# Patient Record
Sex: MALE | Race: WHITE
[De-identification: names, ages, dates, MRNs, and addresses within clinical notes are randomized per-mention and may not be internally consistent; named-entity substitution may affect disease eponyms.]

---

## 2020-02-15 ENCOUNTER — HOSPITAL ENCOUNTER (EMERGENCY)
Dept: HOSPITAL 75 - ER FS | Age: 40
Discharge: HOME | End: 2020-02-15
Payer: COMMERCIAL

## 2020-02-15 VITALS — DIASTOLIC BLOOD PRESSURE: 82 MMHG | SYSTOLIC BLOOD PRESSURE: 131 MMHG

## 2020-02-15 VITALS — BODY MASS INDEX: 32.31 KG/M2 | HEIGHT: 70.98 IN | WEIGHT: 230.82 LBS

## 2020-02-15 DIAGNOSIS — J11.1: Primary | ICD-10-CM

## 2020-02-15 PROCEDURE — 99282 EMERGENCY DEPT VISIT SF MDM: CPT

## 2020-02-15 NOTE — ED FEVER
History of Present Illness


General


Stated Complaint:  FLU SYMPTOMS


Source:  patient


Exam Limitations:  no limitations





History of Present Illness


Date Seen by Provider:  Feb 15, 2020


Time Seen by Provider:  18:22


Initial Comments


40-year-old male presents with cough, fever, chills, body aches, decreased 

appetite, nausea, sore throat that started 2 days ago. Patient is not having 

shortness of breath or chest pain. He is not having a rash.





Allergies and Home Medications


Allergies


Coded Allergies:  


     No Known Drug Allergies (Unverified , 2/15/20)





Home Medications


Oseltamivir Phosphate 75 Mg Cap, 75 MG PO BID


   Prescribed by: RAMONITA ELIZABETH on 2/15/20 8426





Patient Home Medication List


Home Medication List Reviewed:  Yes





Review of Systems


Review of Systems


Constitutional:  see HPI, chills, fever, malaise


EENTM:  throat pain


Respiratory:  cough


Cardiovascular:  no symptoms reported


Gastrointestinal:  No diarrhea; nausea; No vomiting


Musculoskeletal:  see HPI


Skin:  no symptoms reported


Psychiatric/Neurological:  See HPI





Past Medical-Social-Family Hx


Past Med/Social Hx:  Reviewed Nursing Past Med/Soc Hx


Patient Social History


Recent Foreign Travel:  No


Contact w/Someone Who Travel:  No





Physical Exam





Vital Signs - First Documented




















Capillary Refill :


Height: '"


Weight: lbs. oz. kg;  BMI


Method:


General Appearance:  WD/WN, no apparent distress


Eyes:  Bilateral Eye Normal Inspection, Bilateral Eye PERRL


Respiratory:  chest non-tender, lungs clear


Cardiovascular:  normal peripheral pulses, regular rate, rhythm


Gastrointestinal:  non tender, soft


Extremities:  normal capillary refill


Neurologic/Psychiatric:  CNs II-XII nml as tested, alert, normal mood/affect, 

oriented x 3


Skin:  normal color, warm/dry


Lymphatic:  no adenopathy





Progress/Results/Core Measures


Suspected Sepsis


SIRS


Temperature: 


Pulse:  


Respiratory Rate: 


 


Blood Pressure  / 


Mean:





Results/Orders


Vital Signs/I&O











 2/15/20 2/15/20





 18:20 18:20


 


Temp 38.0 


 


Pulse 109 


 


Resp 18 


 


B/P (MAP) 131/82 (98) 


 


Pulse Ox 95 


 


O2 Delivery Room Air Room Air





Capillary Refill :





Departure


Impression





   Primary Impression:  


   Influenza


Disposition:  01 HOME, SELF-CARE


Condition:  Stable





Departure-Patient Inst.


Referrals:  


NO,LOCAL PHYSICIAN (PCP/Family)


Primary Care Physician


Patient Instructions:  Flu, Adult (DC)





Add. Discharge Instructions:  


The emergency department focuses on treating and ruling out life-threatening 

diseases. Whenever possible, a diagnosis is given. However, most patients are 

given an impression based on their  history, physical exam, and workup during 

your brief time in the ER. Information about probable diagnosis and other 

educational material has been provided. Please take the time to read and 

understand this information.





It is very important that you follow up with a physician as discussed during the

visit today. Failure to adhere to your follow-up instructions may lead to severe

disability, injury, or death so please make sure to keep your appointments or 

obtain  one as requested. Please keep in mind the emergency department is not 

designed to your primary care or "family doctor" and nonurgent issues are best 

evaluated by an outpatient physician


Scripts


Oseltamivir Phosphate (Tamiflu) 75 Mg Cap


75 MG PO BID for 5 Days, #10 CAP


   Prov: RAMONITA ELIZABETH DO         2/15/20


Work/School Note:  Work Release Form   Date Seen in the Emergency Department:  

Feb 15, 2020


   Return to Work:  Feb 17, 2020











RAMONITA ELIZABETH DO               Feb 15, 2020 18:22

## 2020-06-21 ENCOUNTER — HOSPITAL ENCOUNTER (EMERGENCY)
Dept: HOSPITAL 75 - ER FS | Age: 40
Discharge: HOME | End: 2020-06-21
Payer: COMMERCIAL

## 2020-06-21 VITALS — WEIGHT: 213.41 LBS | HEIGHT: 70 IN | BODY MASS INDEX: 30.55 KG/M2

## 2020-06-21 VITALS — SYSTOLIC BLOOD PRESSURE: 126 MMHG | DIASTOLIC BLOOD PRESSURE: 89 MMHG

## 2020-06-21 DIAGNOSIS — S93.402A: Primary | ICD-10-CM
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
## 2020-06-21 PROCEDURE — 73610 X-RAY EXAM OF ANKLE: CPT

## 2020-06-21 PROCEDURE — 99283 EMERGENCY DEPT VISIT LOW MDM: CPT

## 2020-06-21 PROCEDURE — 73630 X-RAY EXAM OF FOOT: CPT
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
## 2020-06-21 NOTE — XMS REPORT
Continuity of Care Document

                             Created on: 2020



Hero Barraza

External Reference #: [id number]

: 1980

Sex: Male



Demographics





                          Address                   [address] [place] [address]  [phone]

 

                          Home Phone                [phone] x

 

                          Preferred Language        Unknown

 

                          Marital Status            Unknown

 

                          Orthodox Affiliation     Unknown

 

                          Race                      Unknown

 

                          Ethnic Group              Unknown





Author





                          Organization              Unknown

 

                          Address                   Unknown

 

                          Phone                     Unavailable



              



Allergies

      



             Active           Description           Code           Type         

  Severity   

                Reaction           Onset           Reported/Identified          

 

Relationship to Patient                 Clinical Status        

 

                Yes             No Known Drug Allergies           [id number]    

       Drug 

Allergy           Unknown           N/A                             02/15/2020  

      

                                                             



                  



Medications

      



There is no data.                  



Problems

      



There is no data.                  



Procedures

      



There is no data.                  



Results

      



                    Test                Result              Range        

 

                                        A1C - 10/14/19 10:08         

 

                    HEMOGLOBIN A1c           12.8 % of total Hgb           <5.7 

       

 

                                        A1C - 20 08:41         

 

                    HEMOGLOBIN A1c           12.1 % of total Hgb           <5.7 

       

 

                                        C-PEPTIDE, SERUM - 20 08:00       

  

 

                    C-PEPTIDE           3.18 ng/mL           0.80-3.85        

 

                                        INSULIN LEVEL - 20 08:00         

 

                    INSULIN             15.3 uIU/mL            NRG        



                      



Encounters

      



                ACCT No.           Visit Date/Time           Discharge          

 Status         

             Pt. Type           Provider           Facility           Loc./Unit 

          

Complaint        

 

                [id number]           2020 08:00:00           2020 23:59:

59           CLS

                Outpatient           MARJAN URIAS                          

 [facility]                                       

 

             [id number]           2020 08:00:00                              

       Document

Registration                                                                    

 

             [id number]           2020 08:40:00                              

       Document

Registration                                                                    

 

             [id number]           10/14/2019 09:15:00                              

       Document

Registration                                                                    

 

                [id number]           2020 15:20:00           2020 23:59:

59           CLS

                Outpatient           ALENA MERAZ

[facility]                                       

 

                    [id number]           02/15/2020 18:18:00           02/15/2

020 18:43:00        

                DIS             Emergency           RAMONITA ELIZABETH DO           

[facility]           ER FS                     FLU SYMPTOMS

## 2020-06-21 NOTE — DIAGNOSTIC IMAGING REPORT
HISTORY: Injury to left foot with bruising and swelling



TECHNIQUE: 3 views of the left foot



COMPARISON: None



FINDINGS:



No acute fracture or dislocation is seen in the left foot.

Alignment appears normal. There are mild degenerative changes in

the left foot. There is mild soft tissue swelling diffusely.



IMPRESSION:

1. Diffuse soft tissue swelling of the left foot with no acute

osseous abnormalities seen.



Dictated by: 



  Dictated on workstation # [id number]

## 2022-11-11 ENCOUNTER — HOSPITAL ENCOUNTER (OUTPATIENT)
Dept: HOSPITAL 75 - RAD FS | Age: 42
End: 2022-11-11
Attending: FAMILY MEDICINE
Payer: COMMERCIAL

## 2022-11-11 DIAGNOSIS — R05.1: Primary | ICD-10-CM

## 2022-11-11 DIAGNOSIS — R09.89: ICD-10-CM

## 2022-11-11 PROCEDURE — 71046 X-RAY EXAM CHEST 2 VIEWS: CPT

## 2022-11-11 NOTE — DIAGNOSTIC IMAGING REPORT
INDICATION: Acute cough and crackles.



TIME OF EXAM: 10:02 a.m.



No prior studies are available for comparison.



FINDINGS: The heart size is normal. The pulmonary vascularity is

unremarkable. The lungs are clear. No infiltrate, effusion or

pneumothorax is detected.



IMPRESSION: No acute cardiopulmonary process is detected.



Dictated by: 



  Dictated on workstation # [id number]

## 2023-04-20 ENCOUNTER — HOSPITAL ENCOUNTER (EMERGENCY)
Dept: HOSPITAL 75 - ER FS | Age: 43
Discharge: HOME | End: 2023-04-20
Payer: COMMERCIAL

## 2023-04-20 VITALS — WEIGHT: 220.46 LBS | BODY MASS INDEX: 31.92 KG/M2 | HEIGHT: 69.69 IN

## 2023-04-20 VITALS — DIASTOLIC BLOOD PRESSURE: 84 MMHG | SYSTOLIC BLOOD PRESSURE: 146 MMHG

## 2023-04-20 DIAGNOSIS — J18.9: Primary | ICD-10-CM

## 2023-04-20 DIAGNOSIS — Z28.310: ICD-10-CM

## 2023-04-20 DIAGNOSIS — G47.33: ICD-10-CM

## 2023-04-20 DIAGNOSIS — F17.210: ICD-10-CM

## 2023-04-20 LAB
ALBUMIN SERPL-MCNC: 4.2 GM/DL (ref 3.2–4.5)
ALP SERPL-CCNC: 58 U/L (ref 40–136)
ALT SERPL-CCNC: 29 U/L (ref 0–55)
APTT BLD: 32 SEC (ref 24–35)
BASOPHILS # BLD AUTO: 0 10^3/UL (ref 0–0.1)
BASOPHILS NFR BLD AUTO: 0 % (ref 0–10)
BILIRUB SERPL-MCNC: 0.5 MG/DL (ref 0.1–1)
BUN/CREAT SERPL: 13
CALCIUM SERPL-MCNC: 8.9 MG/DL (ref 8.5–10.1)
CHLORIDE SERPL-SCNC: 106 MMOL/L (ref 98–107)
CO2 SERPL-SCNC: 28 MMOL/L (ref 21–32)
CREAT SERPL-MCNC: 0.83 MG/DL (ref 0.6–1.3)
EOSINOPHIL # BLD AUTO: 0.3 10^3/UL (ref 0–0.3)
EOSINOPHIL NFR BLD AUTO: 4 % (ref 0–10)
GFR SERPLBLD BASED ON 1.73 SQ M-ARVRAT: 110 ML/MIN
GLUCOSE SERPL-MCNC: 137 MG/DL (ref 70–105)
HCT VFR BLD CALC: 51 % (ref 40–54)
HGB BLD-MCNC: 16.6 G/DL (ref 13.3–17.7)
INR PPP: 0.9 (ref 0.8–1.4)
LIPASE SERPL-CCNC: 15 U/L (ref 8–78)
LYMPHOCYTES # BLD AUTO: 1.5 10^3/UL (ref 1–4)
LYMPHOCYTES NFR BLD AUTO: 17 % (ref 12–44)
MAGNESIUM SERPL-MCNC: 2.1 MG/DL (ref 1.6–2.4)
MANUAL DIFFERENTIAL PERFORMED BLD QL: NO
MCH RBC QN AUTO: 29 PG (ref 25–34)
MCHC RBC AUTO-ENTMCNC: 33 G/DL (ref 32–36)
MCV RBC AUTO: 89 FL (ref 80–99)
MONOCYTES # BLD AUTO: 0.6 10^3/UL (ref 0–1)
MONOCYTES NFR BLD AUTO: 7 % (ref 0–12)
NEUTROPHILS # BLD AUTO: 6.4 10^3/UL (ref 1.8–7.8)
NEUTROPHILS NFR BLD AUTO: 72 % (ref 42–75)
PLATELET # BLD: 226 10^3/UL (ref 130–400)
PMV BLD AUTO: 10.6 FL (ref 9–12.2)
POTASSIUM SERPL-SCNC: 3.9 MMOL/L (ref 3.6–5)
PROT SERPL-MCNC: 7.1 GM/DL (ref 6.4–8.2)
PROTHROMBIN TIME: 13 SEC (ref 12.2–14.7)
SODIUM SERPL-SCNC: 144 MMOL/L (ref 135–145)
WBC # BLD AUTO: 9 10^3/UL (ref 4.3–11)

## 2023-04-20 PROCEDURE — 71045 X-RAY EXAM CHEST 1 VIEW: CPT

## 2023-04-20 PROCEDURE — 84484 ASSAY OF TROPONIN QUANT: CPT

## 2023-04-20 PROCEDURE — 83735 ASSAY OF MAGNESIUM: CPT

## 2023-04-20 PROCEDURE — 80053 COMPREHEN METABOLIC PANEL: CPT

## 2023-04-20 PROCEDURE — 85610 PROTHROMBIN TIME: CPT

## 2023-04-20 PROCEDURE — 93005 ELECTROCARDIOGRAM TRACING: CPT

## 2023-04-20 PROCEDURE — 36415 COLL VENOUS BLD VENIPUNCTURE: CPT

## 2023-04-20 PROCEDURE — 85730 THROMBOPLASTIN TIME PARTIAL: CPT

## 2023-04-20 PROCEDURE — 83690 ASSAY OF LIPASE: CPT

## 2023-04-20 PROCEDURE — 83880 ASSAY OF NATRIURETIC PEPTIDE: CPT

## 2023-04-20 PROCEDURE — 93041 RHYTHM ECG TRACING: CPT

## 2023-04-20 PROCEDURE — 85025 COMPLETE CBC W/AUTO DIFF WBC: CPT

## 2023-04-20 NOTE — ED CHEST PAIN
General


Chief Complaint:  Chest Pain


Stated Complaint:  CHEST PAIN


Nursing Triage Note:  


PT REPORTS HE HAS HAD CHEST PAIN OFF AND ON FOR 2 WEEKS, WORSE THE PAST 3 DAYS. 




HE STARTED WEARING THE THRIVE WEIGHT LOSS PATCH 2 WEEKS AGO.


Source:  patient


Exam Limitations:  no limitations





History of Present Illness


Date Seen by Provider:  2023


Time Seen by Provider:  12:20


Initial Comments


43-year-old male with past medical history of diabetes, hypertension, and 

smoking coming in as a referral from walk-in clinic due to chest pain.  Its been

going on for several weeks, has been absolutely constant for the past 5 to 6 

days, right now is mild, sharp/burning, center of his chest.  He works nights, 

notices it better with activity, worse with rest when he is sitting down and 

thinking about it.  Denies any associated nausea, vomiting, weakness, numbness, 

shortness of breath, abdominal pain, or any other concerns.  Has had a mildly 

productive cough.  Has not taken any medicines for it as of yet.  Is otherwise 

denying any other acute complaints.  Denies any prior history of DVT or PE, no 

lower extremity swelling or pain, no recent surgery, no hormone use, and no 

hemoptysis.





Of note, he has been wearing "Thrive black label" weight loss patch. Upon a 

search on the internet it essentially has coffee bean extract, green tea, and 

aloe vera in it.





Allergies and Home Medications


Allergies


Coded Allergies:  


     No Known Drug Allergies (Unverified , 2/15/20)





Patient Home Medication List


Home Medication List Reviewed:  Yes


Oseltamivir Phosphate (Tamiflu) 75 Mg Cap, 75 MG PO BID


   Prescribed by: RAMONITA ELIZABETH on 2/15/20 6692





Review of Systems


Review of Systems


Constitutional:  No fever


EENTM:  No Symptoms Reported


Respiratory:  Cough


Cardiovascular:  Chest Pain


Gastrointestinal:  No Symptoms Reported


Genitourinary:  No Symptoms Reported


Musculoskeletal:  no symptoms reported


Skin:  no symptoms reported


Psychiatric/Neurological:  No Symptoms Reported


Endocrine:  No Symptoms Reported


Hematologic/Lymphatic:  No Symptoms Reported





Past Medical-Social-Family Hx


Patient Social History


Tobacco Use?:  Yes


Tobacco type used:  Cigarettes


Smoking Status:  Current Everyday Smoker


Use of E-Cig and/or Vaping dev:  No


Substance use?:  No


Alcohol Use?:  No


Pt feels they are or have been:  No





Seasonal Allergies


Seasonal Allergies:  No





Past Medical History


Surgeries:  Yes (Hernia repair x2)


Orthopedic


Respiratory:  No


Cardiac:  No


Neurological:  No


Genitourinary:  No


Gastrointestinal:  No


Musculoskeletal:  No


Endocrine:  Yes


Diabetes, Non-Insulin dep


HEENT:  No


Cancer:  No


Psychosocial:  No


Integumentary:  No


Blood Disorders:  No





Physical Exam


Vital Signs





Vital Signs - First Documented








 23





 12:28


 


Temp 36.4


 


Pulse 101


 


Resp 16


 


B/P (MAP) 152/90 (110)


 


Pulse Ox 100


 


O2 Delivery Room Air





Capillary Refill : Less Than 3 Seconds


Height, Weight, BMI


Height: '"


Weight: lbs. oz. kg; 31.00 BMI


Method:


General Appearance:  No Apparent Distress, WD/WN


HEENT:  PERRL/EOMI, Normal ENT Inspection, Pharynx Normal


Neck:  Full Range of Motion, Normal Inspection, Non Tender, Supple


Respiratory:  Chest Non Tender, Lungs Clear, Normal Breath Sounds, No Accessory 

Muscle Use, No Respiratory Distress


Cardiovascular:  Regular Rate, Rhythm, No Edema, Normal Peripheral Pulses


Gastrointestinal:  Normal Bowel Sounds, Non Tender, Soft; No Distended, No 

Guarding


Extremity:  Normal Capillary Refill, Normal Inspection, Normal Range of Motion, 

Non Tender, No Calf Tenderness, No Pedal Edema


Neurologic/Psychiatric:  Alert, No Motor/Sensory Deficits, Normal Mood/Affect


Skin:  Normal Color, Warm/Dry





Progress/Results/Core Measures


Results/Orders


Lab Results





Laboratory Tests








Test


 23


12:27 Range/Units


 


 


White Blood Count


 9.0 


 4.3-11.0


10^3/uL


 


Red Blood Count


 5.70 H


 4.30-5.52


10^6/uL


 


Hemoglobin 16.6  13.3-17.7  g/dL


 


Hematocrit 51  40-54  %


 


Mean Corpuscular Volume 89  80-99  fL


 


Mean Corpuscular Hemoglobin 29  25-34  pg


 


Mean Corpuscular Hemoglobin


Concent 33 


 32-36  g/dL





 


Red Cell Distribution Width 13.0  10.0-14.5  %


 


Platelet Count


 226 


 130-400


10^3/uL


 


Mean Platelet Volume 10.6  9.0-12.2  fL


 


Immature Granulocyte % (Auto) 0   %


 


Neutrophils (%) (Auto) 72  42-75  %


 


Lymphocytes (%) (Auto) 17  12-44  %


 


Monocytes (%) (Auto) 7  0-12  %


 


Eosinophils (%) (Auto) 4  0-10  %


 


Basophils (%) (Auto) 0  0-10  %


 


Neutrophils # (Auto)


 6.4 


 1.8-7.8


10^3/uL


 


Lymphocytes # (Auto)


 1.5 


 1.0-4.0


10^3/uL


 


Monocytes # (Auto)


 0.6 


 0.0-1.0


10^3/uL


 


Eosinophils # (Auto)


 0.3 


 0.0-0.3


10^3/uL


 


Basophils # (Auto)


 0.0 


 0.0-0.1


10^3/uL


 


Immature Granulocyte # (Auto)


 0.0 


 0.0-0.1


10^3/uL


 


Prothrombin Time 13.0  12.2-14.7  SEC


 


INR Comment 0.9  0.8-1.4  


 


Activated Partial


Thromboplast Time 32 


 24-35  SEC





 


Sodium Level 144  135-145  MMOL/L


 


Potassium Level 3.9  3.6-5.0  MMOL/L


 


Chloride Level 106    MMOL/L


 


Carbon Dioxide Level 28  21-32  MMOL/L


 


Anion Gap 10  5-14  MMOL/L


 


Blood Urea Nitrogen 11  7-18  MG/DL


 


Creatinine


 0.83 


 0.60-1.30


MG/DL


 


Estimat Glomerular Filtration


Rate 110 


  





 


BUN/Creatinine Ratio 13   


 


Glucose Level 137 H   MG/DL


 


Calcium Level 8.9  8.5-10.1  MG/DL


 


Corrected Calcium 8.7  8.5-10.1  MG/DL


 


Magnesium Level 2.1  1.6-2.4  MG/DL


 


Total Bilirubin 0.5  0.1-1.0  MG/DL


 


Aspartate Amino Transf


(AST/SGOT) 20 


 5-34  U/L





 


Alanine Aminotransferase


(ALT/SGPT) 29 


 0-55  U/L





 


Alkaline Phosphatase 58    U/L


 


Troponin I < 0.30  <0.30  NG/ML


 


Pro-B-Type Natriuretic Peptide 73.3  <125.0  PG/ML


 


Total Protein 7.1  6.4-8.2  GM/DL


 


Albumin 4.2  3.2-4.5  GM/DL


 


Lipase 15  8-78  U/L








My Orders





Orders - LUCIANO DIAS MD


Cbc With Automated Diff (23 12:33)


Magnesium (23 12:33)


Chest 1 View Ap/Pa Only (23 12:33)


Ekg Tracing (23 12:33)


Comprehensive Metabolic Panel (23 12:33)


Protime With Inr (4/20/23 12:33)


Partial Thromboplastin Time (23 12:33)


O2 (23 12:33)


Monitor-Rhythm Ecg Trace Only (23 12:33)


Aspirin Chewable Tablet (Baby Aspirin Ch (23 12:45)


Ed Iv/Invasive Line Start (23 12:33)


Lipase (23 12:33)


Troponin I Fs (23 12:33)


Probnp Fs (23 12:33)


Lidocaine 2% Viscous 15 Ml (Xylocaine Vi (23 12:45)


Famotidine Tablet (Pepcid Tablet) (23 12:33)


Antacid  Suspension (Mylanta  Suspension (23 12:45)


Nitroglycerin Ointment (Nitrobid Ointme (23 12:45)





Medications Given in ED





Current Medications








 Medications  Dose


 Ordered  Sig/Yordy


 Route  Start Time


 Stop Time Status Last Admin


Dose Admin


 


 Al Hydrox/Mg


 Hydrox/Simethicone  30 ml  ONCE  ONCE


 PO  23 12:45


 23 12:46 DC 23 12:41


30 ML


 


 Aspirin  324 mg  ONCE  ONCE


 PO  23 12:45


 23 12:46 DC 23 12:41


324 MG


 


 Lidocaine HCl  15 ml  ONCE  ONCE


 PO  23 12:45


 23 12:46 DC 23 12:41


15 ML


 


 Nitroglycerin  0.5 inch  ONCE  ONCE


 TOP  23 12:45


 23 12:46 DC 23 12:41


0.5 INCH








Vital Signs/I&O











 23





 12:28


 


Temp 36.4


 


Pulse 101


 


Resp 16


 


B/P (MAP) 152/90 (110)


 


Pulse Ox 100


 


O2 Delivery Room Air














Blood Pressure Mean:                    110











Progress


Progress Note :  


Progress Note


43-year-old male with above history coming in for chest pain.  ABCs were intact 

and vitals were stable on presentation.  Heart rate just at 100 when he walked 

in, goes down to the high 80s/low 90s with sitting.  He is not feeling short of 

breath, no lower extremity swelling or pain, is otherwise low risk for PE per 

[place] criteria.  I think it be highly unlikely for him to have a PE at this 

time.  EKG ordered and interpreted by me showing no acute ischemic changes.  An 

IV was placed and basic labs were obtained and were significant for a normal 

BNP, negative troponin, normal creatinine, normal white blood cell count.  Chest

x-ray ordered and interpreted by me showing likely left pleural effusion versus 

opacity concerning for infection.  Given his worsening cough and a smoking 

history, we will treat him with antibiotics for bacterial infection.  Given the 

constant pain for days, that is better with activity, notices it more with rest,

this is not really consistent with ACS.  He does have risk factors however, so I

want him to follow-up with cardiology as an outpatient.  He has normal distal 

pulses, and symptoms are too mild to really be consistent with aortic 

dissection.  I believe he is otherwise stable for discharge with outpatient 

follow-up.  He was sent home with strict return precautions





Of note, the patient does have sleep apnea that is been diagnosed, and supposed 

to use a CPAP with sleep.  When sleeping in the ER, his oxygen did go down as 

expected.  Would go right back up when he was woken up.


Initial ECG Impression Date:  2023


Initial ECG Impression Time:  12:29


Initial ECG Rate:  1000


Initial ECG Rhythm:  S.Tach


Comment


Narrow QRS, borderline left axis deviation, no significant ST changes or T wave 

abnormalities





Diagnostic Imaging





   Diagonstic Imaging:  Xray (chest)


Comments


NAME:   ENRRIQUE OROZCO TEJAL


[facility] REC#:   [id number]


ACCOUNT#:   [id number]


PT STATUS:   REG ER


:   1980


PHYSICIAN:   LUCIANO DIAS MD


ADMIT DATE:   23/ER FS


                                   ***Draft***


Date of Exam:23





CHEST 1 VIEW AP/PA ONLY








EXAMINATION: Chest 1 view





HISTORY: Chest pain





COMPARISON: None available.





FINDINGS: 





Heart size and pulmonary vasculature are normal. There are mild


interstitial opacities in the left lung base with small left


pleural effusion. No pneumothorax. The osseous structures are


intact.





IMPRESSION: 





1. Small left pleural effusion with left basilar atelectasis or


consolidation.





  Dictated on workstation # [id number]








Dict:   23 1251


Trans:   23 1256


[facility] [phone]





Interpreted by:     ENRRIQUE MONTGOMERY DO


Electronically signed by:





Departure


Impression





   Primary Impression:  


   Chest pain


   Qualified Codes:  R07.1 - Chest pain on breathing


   Additional Impressions:  


   MASON (obstructive sleep apnea)


   Pneumonia


   Qualified Codes:  J18.9 - Pneumonia, unspecified organism


Disposition:  01 HOME, SELF-CARE


Condition:  Stable





Departure-Patient Inst.


Decision time for Depature:  13:55


Referrals:  


JOSE E GARSIA MD (PCP)


Primary Care Physician








PRO MCGILL MD


Patient Instructions:  Pneumonia, Adult ED, Chest Pain





Add. Discharge Instructions:  


We are not seeing any evidence of any type of heart attack.  However you do have

risk factors that would be concerning for heart disease, especially as you get 

older.  Please follow-up with Dr. Mcgill, the cardiologist in [place].  His 

number is in this paperwork.  If pain worsens, particularly if pain gets very 

severe with activity, better with rest, then that would be more concerning.  

Start taking a baby aspirin daily which you can buy over-the-counter.


You also have an infection in your left lower lung which could be causing 

symptoms.  You will be on antibiotics for the next week.


Scripts


Doxycycline Hyclate (Doxycycline Hyclate) 100 Mg Tablet


100 MG PO BID for 7 Days, #14 TAB 0 Refills


   Prov: LUCIANO DIAS MD         23


Work/School Note:  Family Work Note,    Patient Received Medical Care In the 

Emergency Department On:  2023


   Patient Will Be Able to Return to Work/School On:  2023


   Work Release Form   Date Seen in the Emergency Department:  2023


   Return to Work:  2023


   Restrictions:  No Restrictions











LUCIANO DIAS MD          2023 12:43

## 2023-04-20 NOTE — DIAGNOSTIC IMAGING REPORT
EXAMINATION: Chest 1 view



HISTORY: Chest pain



COMPARISON: None available.



FINDINGS: 



Heart size and pulmonary vasculature are normal. There are mild

interstitial opacities in the left lung base with small left

pleural effusion. No pneumothorax. The osseous structures are

intact.



IMPRESSION: 



1. Small left pleural effusion with left basilar atelectasis or

consolidation.



Dictated by: 



  Dictated on workstation # [id number]

## 2023-06-08 ENCOUNTER — HOSPITAL ENCOUNTER (OUTPATIENT)
Dept: HOSPITAL 75 - CARDFS | Age: 43
End: 2023-06-08
Attending: INTERNAL MEDICINE
Payer: COMMERCIAL

## 2023-06-08 DIAGNOSIS — I11.9: Primary | ICD-10-CM

## 2023-06-08 DIAGNOSIS — I25.10: ICD-10-CM

## 2023-06-08 PROCEDURE — 93306 TTE W/DOPPLER COMPLETE: CPT
